# Patient Record
Sex: MALE | Race: BLACK OR AFRICAN AMERICAN | NOT HISPANIC OR LATINO | Employment: STUDENT | URBAN - METROPOLITAN AREA
[De-identification: names, ages, dates, MRNs, and addresses within clinical notes are randomized per-mention and may not be internally consistent; named-entity substitution may affect disease eponyms.]

---

## 2024-06-18 ENCOUNTER — TRANSFERRED RECORDS (OUTPATIENT)
Dept: HEALTH INFORMATION MANAGEMENT | Facility: CLINIC | Age: 29
End: 2024-06-18
Payer: COMMERCIAL

## 2024-06-20 ENCOUNTER — MEDICAL CORRESPONDENCE (OUTPATIENT)
Dept: HEALTH INFORMATION MANAGEMENT | Facility: CLINIC | Age: 29
End: 2024-06-20
Payer: COMMERCIAL

## 2024-07-03 ENCOUNTER — TRANSCRIBE ORDERS (OUTPATIENT)
Dept: OTHER | Age: 29
End: 2024-07-03

## 2024-07-03 DIAGNOSIS — M87.00 AVN (AVASCULAR NECROSIS OF BONE) (H): Primary | ICD-10-CM

## 2024-08-12 DIAGNOSIS — M87.00 AVN (AVASCULAR NECROSIS OF BONE) (H): Primary | ICD-10-CM

## 2024-08-30 NOTE — TELEPHONE ENCOUNTER
REASON FOR VISIT: Bilateral AVN of Hips    DATE OF APPT: 9/03/2024   NOTES (FOR ALL VISITS) STATUS DETAILS   OFFICE NOTE from referring provider [facility]  Dallin Campbell PA-C   MEDICATION LIST N/A    IMAGING  (FOR ALL VISITS)     XR Scheduled [facility]  XR Pelvis and hips 9/03/2024   MRI (HEAD, NECK, SPINE) [facility]  MR Hip right 6/18/2024  MR Hip left 6/18/2024   CT (HEAD, NECK, SPINE) N/A

## 2024-09-03 ENCOUNTER — PRE VISIT (OUTPATIENT)
Dept: ORTHOPEDICS | Facility: CLINIC | Age: 29
End: 2024-09-03

## 2024-09-03 ENCOUNTER — OFFICE VISIT (OUTPATIENT)
Dept: ORTHOPEDICS | Facility: CLINIC | Age: 29
End: 2024-09-03
Payer: COMMERCIAL

## 2024-09-03 ENCOUNTER — ANCILLARY PROCEDURE (OUTPATIENT)
Dept: GENERAL RADIOLOGY | Facility: CLINIC | Age: 29
End: 2024-09-03
Attending: ORTHOPAEDIC SURGERY
Payer: COMMERCIAL

## 2024-09-03 VITALS — BODY MASS INDEX: 17.18 KG/M2 | WEIGHT: 120 LBS | HEIGHT: 70 IN

## 2024-09-03 DIAGNOSIS — M87.00 AVN (AVASCULAR NECROSIS OF BONE) (H): ICD-10-CM

## 2024-09-03 DIAGNOSIS — N39.41 URGE INCONTINENCE OF URINE: Primary | ICD-10-CM

## 2024-09-03 PROCEDURE — 99204 OFFICE O/P NEW MOD 45 MIN: CPT | Mod: GC | Performed by: ORTHOPAEDIC SURGERY

## 2024-09-03 PROCEDURE — 73522 X-RAY EXAM HIPS BI 3-4 VIEWS: CPT | Performed by: RADIOLOGY

## 2024-09-03 ASSESSMENT — HOOS JR
GOING UP OR DOWN STAIRS: EXTREME
SITTING: MODERATE
WALKING ON UNEVEN SURFACE: EXTREME
LYING IN BED (TURNING OVER, MAINTAINING HIP POSITION): SEVERE
HOOS JR TOTAL INTERVAL SCORE: 29.01
RISING FROM SITTING: SEVERE
BENDING TO THE FLOOR TO PICK UP OBJECT: SEVERE

## 2024-09-03 ASSESSMENT — PAIN SCALES - GENERAL: PAINLEVEL: WORST PAIN (10)

## 2024-09-03 NOTE — PROGRESS NOTES
Assessment:   Reji is a 30 yo M who presents with bilateral osteonecrosis of the femoral head. We discussed common causes of osteonecrosis of the femoral head including EtOH abuse, steroid use, and sickle cell disease. We also discussed that this can occur without a clear cause, Richardson's syndrome. We discussed that the treatment for this would be total hip replacement. We will have him follow up with urology due to the patient urge urinary incontinence. He was a prior lumbar spine MRI, which was negative and his exam did not have concerning spine findings. The patient understood the and had all his questions answered.     Plan:  -Referral to urology for urodynamic testing due to urge urinary incontinence in the setting of unremarkable lumbar spine  -RTC for discussion of total hip arthroplasty after being evaluated by urology       Chief Complaint: Consult (KHADAR FARRIS )      Physician:  Dallin Campbell PA-C    HPI: Reji Cohn is a 29 year old male who presents today for evaluation of bilateral hip pain. The patient reports that his symptoms have been present for 2 years and are worsening. He now is having difficulty walking and has pain any movement of bilateral hips. He denies injury. He reports 2-3 EtOH drinks on the weekend. He smokes 1/3PPD. He reports marijuana use but no other elicit drug use.  He denies steroid use. He reports that his hip pain has not progressed to involve his back. He reports occasional numbness and tingling that radiates down the outside of his legs. He reports urge urinary incontinence the occurs multiple times per week. He has had a lumbar MRI in January which was unremarkable.     Location of symptoms:  bilateral hip pain  Onset: insidious onset   Duration of symptoms: 2 yrs  Quality of symptoms: sharp aching pain  Severity: 7/10  Alleviating: activity modification  Exacerbating: activities, movement of hips   Previous Treatments: Previous treatments include activity  "modification, oral pain medication    NORMAN Jr: 29.01   PROMIS Mental: (P) 6  PROMIS Physical: (P) 6  PROMIS Total: (P) 12  UCLA Activity Scale: 3    MEDICAL HISTORY: No past medical history on file.    Pertinent negatives:  Patient has no history of DVT or PE. Discussed risk factors.    Medications:   No current outpatient medications on file.    Allergies: Patient has no known allergies.    SURGICAL HISTORY: No past surgical history on file.    HISTORY: No family history on file.    SOCIAL HISTORY:   Social History     Tobacco Use    Smoking status: Not on file    Smokeless tobacco: Not on file   Substance Use Topics    Alcohol use: Not on file       REVIEW OF SYSTEMS:  The comprehensive review of systems from the intake form was reviewed with the patient.  No fever, weight change or fatigue. No dry eyes. No oral ulcers, sore throat or voice change. No palpitations, syncope, angina or edema.  No chest pain, excessive sleepiness, shortness of breath or hemoptysis.   No abdominal pain, nausea, vomiting, diarrhea or heartburn.  No skin rash. No focal weakness or numbness. No bleeding or lymphadenopathy. No rhinitis or hives.     Exam:  On physical examination the patient appears the stated age, is in no acute distress, alert and oriented, affect is appropriate, and breathing is non-labored.  Vitals are documented in the EMR and have been reviewed:    Ht 1.778 m (5' 10\")   Wt 54.4 kg (120 lb)   BMI 17.22 kg/m    5' 10\"  Body mass index is 17.22 kg/m .    Rises from chair: with pain   Gait: antalgic   Gains the exam table: with pain and some difficulty     RIGHT hip subjective:  Abd: 15   Add: 0  Flexion: 45  IRF: 0   ERF: 15   Tenderness to palpation: over the GT     LEFT hip subjective:  Abd: 15   Add: 0  Flexion: 45  IRF: 0   ERF: 15   Tenderness to palpation: over the GT     No ankle clonus bilaterally   Negative freeman's  2+ reflexes achilles, patellar    Distal the circulatory, motor, and sensation exam is " intact with 5/5 EHL, gastroc-soleus, and tibialis anterior.  Sensation to light touch is intact.  Dorsalis pedis and posterior tibialis pulses are palpable.  There are no sores on the feet, no bruising, and no lymphedema.    Imaging:   XR of pelvis and bilateral hips reveals severe osteonecrosis of the femoral head with collapse.

## 2024-09-03 NOTE — LETTER
9/3/2024      Reji Cohn  [address]  [place] [facility] [place] [facility] [address]      Dear Colleague,    Thank you for referring your patient, Reji Cohn, to the [facility]. Please see a copy of my visit note below.    Assessment:   Reji is a 28 yo M who presents with bilateral osteonecrosis of the femoral head. We discussed common causes of osteonecrosis of the femoral head including EtOH abuse, steroid use, and sickle cell disease. We also discussed that this can occur without a clear cause, Richardson's syndrome. We discussed that the treatment for this would be total hip replacement. We will have him follow up with urology due to the patient urge urinary incontinence. He was a prior lumbar spine MRI, which was negative and his exam did not have concerning spine findings. The patient understood the and had all his questions answered.     Plan:  -Referral to urology for urodynamic testing due to urge urinary incontinence in the setting of unremarkable lumbar spine  -RTC for discussion of total hip arthroplasty after being evaluated by urology       Chief Complaint: Consult (KHADAR FARRIS )      Physician:  Dallin Campbell PA-C    HPI: Reji Cohn is a 29 year old male who presents today for evaluation of bilateral hip pain. The patient reports that his symptoms have been present for 2 years and are worsening. He now is having difficulty walking and has pain any movement of bilateral hips. He denies injury. He reports 2-3 EtOH drinks on the weekend. He smokes 1/3PPD. He reports marijuana use but no other elicit drug use.  He denies steroid use. He reports that his hip pain has not progressed to involve his back. He reports occasional numbness and tingling that radiates down the outside of his legs. He reports urge urinary incontinence the occurs multiple times per week. He has had a lumbar MRI in January which was unremarkable.     Location of symptoms:  bilateral hip  "pain  Onset: insidious onset   Duration of symptoms: 2 yrs  Quality of symptoms: sharp aching pain  Severity: 7/10  Alleviating: activity modification  Exacerbating: activities, movement of hips   Previous Treatments: Previous treatments include activity modification, oral pain medication    NORMAN Jr: 29.01   PROMIS Mental: (P) 6  PROMIS Physical: (P) 6  PROMIS Total: (P) 12  UCLA Activity Scale: 3    MEDICAL HISTORY: No past medical history on file.    Pertinent negatives:  Patient has no history of DVT or PE. Discussed risk factors.    Medications:   No current outpatient medications on file.    Allergies: Patient has no known allergies.    SURGICAL HISTORY: No past surgical history on file.    HISTORY: No family history on file.    SOCIAL HISTORY:   Social History     Tobacco Use     Smoking status: Not on file     Smokeless tobacco: Not on file   Substance Use Topics     Alcohol use: Not on file       REVIEW OF SYSTEMS:  The comprehensive review of systems from the intake form was reviewed with the patient.  No fever, weight change or fatigue. No dry eyes. No oral ulcers, sore throat or voice change. No palpitations, syncope, angina or edema.  No chest pain, excessive sleepiness, shortness of breath or hemoptysis.   No abdominal pain, nausea, vomiting, diarrhea or heartburn.  No skin rash. No focal weakness or numbness. No bleeding or lymphadenopathy. No rhinitis or hives.     Exam:  On physical examination the patient appears the stated age, is in no acute distress, alert and oriented, affect is appropriate, and breathing is non-labored.  Vitals are documented in the EMR and have been reviewed:    Ht 1.778 m (5' 10\")   Wt 54.4 kg (120 lb)   BMI 17.22 kg/m    5' 10\"  Body mass index is 17.22 kg/m .    Rises from chair: with pain   Gait: antalgic   Gains the exam table: with pain and some difficulty     RIGHT hip subjective:  Abd: 15   Add: 0  Flexion: 45  IRF: 0   ERF: 15   Tenderness to palpation: over the GT "     LEFT hip subjective:  Abd: 15   Add: 0  Flexion: 45  IRF: 0   ERF: 15   Tenderness to palpation: over the GT     No ankle clonus bilaterally   Negative freeman's  2+ reflexes achilles, patellar    Distal the circulatory, motor, and sensation exam is intact with 5/5 EHL, gastroc-soleus, and tibialis anterior.  Sensation to light touch is intact.  Dorsalis pedis and posterior tibialis pulses are palpable.  There are no sores on the feet, no bruising, and no lymphedema.    Imaging:   XR of pelvis and bilateral hips reveals severe osteonecrosis of the femoral head with collapse.         I have personally examined this patient and have reviewed the clinical presentation and progress note with the resident. I agree with the treatment plan as outlined. The plan was formulated with the resident on the day of the resident's dictation.      Again, thank you for allowing me to participate in the care of your patient.        Sincerely,        Matthew Brar MD

## 2024-09-03 NOTE — NURSING NOTE
"Reji Cohn's chief complaint for this visit includes:  Chief Complaint   Patient presents with    Consult     KHADAR FARRIS        Referring Provider:  PATRICIA Feliz  [address] [place] [address]    Ht 1.778 m (5' 10\")   Wt 54.4 kg (120 lb)   BMI 17.22 kg/m    Worst Pain (10)   Global Mental Health Score: (P) 6  Global Physical Health Score: (P) 6  PROMIS TOTAL - SUBSCORES: (P) 12  UCLA: 3  NORMAN Cornejo 29.01    Pain increases with: Constant, walking, standing/sitting for extended periods of time.   Previous surgeries: None  Previous injections within last 6 months: NO  Treatments done: OTC, Marijuana  Imaging completed: XR and MRI  Pain: 10/10  Concerns: wants it fixed, pain is moving to his back now.     Jefferson Grewal, ATC            "

## 2024-09-09 ENCOUNTER — TELEPHONE (OUTPATIENT)
Dept: ORTHOPEDICS | Facility: CLINIC | Age: 29
End: 2024-09-09
Payer: COMMERCIAL

## 2024-09-09 NOTE — CONFIDENTIAL NOTE
Other: Patient would like to talk to the provider or the providers team about his pain and also about when his surgery should be. Please call patient back when you can.     Could we send this information to you in iTwin or would you prefer to receive a phone call?:   Patient would prefer a phone call   Okay to leave a detailed message?: Yes at Cell number on file:    Telephone Information:   Mobile [phone]

## 2024-09-09 NOTE — TELEPHONE ENCOUNTER
Called and let Pt know that the only barrier to surgery at this time is to find a cause for his urge incontience as this may cause complications during anaesthesia/surgery. Pt understand this and would like assistance in scheduling consult with Urology.

## 2024-09-09 NOTE — TELEPHONE ENCOUNTER
9/9 First opening is December 2024.     Can we can the status of referral to get a sooner appointment?     Thanks   [facility]   Orthopedics, Podiatry, Sports Medicine, Ent ,Eye , Audiology, Adult Endocrine & Diabetes, Nutrition & Medication Therapy Management Specialties   [facility]

## 2024-09-11 ENCOUNTER — PRE VISIT (OUTPATIENT)
Dept: UROLOGY | Facility: CLINIC | Age: 29
End: 2024-09-11
Payer: COMMERCIAL

## 2024-09-11 NOTE — TELEPHONE ENCOUNTER
Reason for visit: consult     Relevant information: urge incontinence    Records/imaging/labs/orders: in epic    Pt called: No need for a call    At Rooming: triston Talamantes  9/11/2024  3:49 PM

## 2024-09-11 NOTE — TELEPHONE ENCOUNTER
MEDICAL RECORDS REQUEST   [facility]  Urology Clinic  [address] [place] [address]  PHONE: [phone]  Fax: [phone]        FUTURE VISIT INFORMATION                                                   Reji Cohn, : 1995 scheduled for future visit at [facility]    APPOINTMENT INFORMATION:  Date: 2024  Provider:  Luis Moore NP  Reason for Visit/Diagnosis: AVN (avascular necrosis of bone) (H) [M87.00], Urge incontinence of urine [N39.41]    REFERRAL INFORMATION:  Referring provider:  Matthew Brar MD  Referring providers clinic:   ORTHO SURG    RECORDS REQUESTED FOR VISIT                                                     NOTES  STATUS/DETAILS   OFFICE NOTE from referring provider  yes - 24   MEDICATION LIST  no     PRE-VISIT CHECKLIST      Joint diagnostic appointment coordinated correctly          (ensure right order & amount of time) Yes   RECORD COLLECTION COMPLETE Yes

## 2024-09-13 ENCOUNTER — OFFICE VISIT (OUTPATIENT)
Dept: UROLOGY | Facility: CLINIC | Age: 29
End: 2024-09-13
Attending: ORTHOPAEDIC SURGERY
Payer: COMMERCIAL

## 2024-09-13 ENCOUNTER — PRE VISIT (OUTPATIENT)
Dept: UROLOGY | Facility: CLINIC | Age: 29
End: 2024-09-13

## 2024-09-13 VITALS — HEART RATE: 84 BPM | SYSTOLIC BLOOD PRESSURE: 130 MMHG | DIASTOLIC BLOOD PRESSURE: 86 MMHG

## 2024-09-13 DIAGNOSIS — N39.41 URGE INCONTINENCE OF URINE: ICD-10-CM

## 2024-09-13 DIAGNOSIS — M87.00 AVN (AVASCULAR NECROSIS OF BONE) (H): ICD-10-CM

## 2024-09-13 LAB
ALBUMIN UR-MCNC: NEGATIVE MG/DL
APPEARANCE UR: CLEAR
BILIRUB UR QL STRIP: NEGATIVE
COLOR UR AUTO: ABNORMAL
GLUCOSE UR STRIP-MCNC: NEGATIVE MG/DL
HGB UR QL STRIP: NEGATIVE
KETONES UR STRIP-MCNC: NEGATIVE MG/DL
LEUKOCYTE ESTERASE UR QL STRIP: NEGATIVE
MUCOUS THREADS #/AREA URNS LPF: PRESENT /LPF
NITRATE UR QL: NEGATIVE
PH UR STRIP: 6 [PH] (ref 5–7)
RBC URINE: 0 /HPF
SP GR UR STRIP: 1.02 (ref 1–1.03)
UROBILINOGEN UR STRIP-MCNC: NORMAL MG/DL
WBC URINE: 1 /HPF

## 2024-09-13 PROCEDURE — 81001 URINALYSIS AUTO W/SCOPE: CPT | Performed by: PATHOLOGY

## 2024-09-13 PROCEDURE — 99204 OFFICE O/P NEW MOD 45 MIN: CPT

## 2024-09-13 RX ORDER — TOLTERODINE TARTRATE 2 MG/1
2 TABLET, EXTENDED RELEASE ORAL 2 TIMES DAILY
Qty: 30 TABLET | Refills: 1 | Status: SHIPPED | OUTPATIENT
Start: 2024-09-13 | End: 2024-09-17

## 2024-09-13 RX ORDER — KETOROLAC TROMETHAMINE 10 MG/1
TABLET, FILM COATED ORAL
COMMUNITY
Start: 2024-06-20

## 2024-09-13 RX ORDER — IBUPROFEN 600 MG/1
600 TABLET, FILM COATED ORAL
COMMUNITY
Start: 2023-07-27

## 2024-09-13 RX ORDER — ACETAMINOPHEN 325 MG/1
325-650 TABLET ORAL
COMMUNITY
Start: 2023-07-27

## 2024-09-13 NOTE — PROGRESS NOTES
HPI:  Reji Cohn is a 29 year old male w/ avascular osteonecrosis of bilateral femoral head being seen for UUI.    Patient is followed by Dr. Brar from Ortho for bilateral osteonecrosis of femoral head.  During his last visit with Dr. Brar on 9/3/2024, they discussed total hip replacement.  Dr. Brar would like him to follow-up with urology for UUI and get UDS prior to scheduling for total hip replacement.  He had a negative lumbar spine MRI, and his spinal exam was negative.    Today-  -patient has had hip pain for 2 years  -Lower back pain in urgency incontinence started 6 months  - he uses tissues for UI, changed every hour  - bladder pain when urinating,   - frequency/urgency - void every hour while awake, nocturia x3  - no gross hematuria, no straining, strong flow, no constipation      Reviewed previous notes from Dr. Collado from Ortho service at [facility]    Exam:  /86 (BP Location: Right arm, Patient Position: Sitting, Cuff Size: Adult Regular)   Pulse 84   General: age-appropriate appearing male in NAD sitting in an exam chair  HEENT: Head AT/NC, EOMI, CN Grossly intact.  Resp: no respiratory distress  Abdomen: Degree of obesity is none. Abdomen is soft and nontender. No organomegaly  : testicles normal without atrophy or masses, no hernias, and penis normal without urethral discharge  Motor: excellent strength throughout    Review of Imaging:  The following imaging exams were independently viewed and interpreted by me and discussed with patient:  none    Review of Labs:  The following labs were reviewed by me and discussed with the patient:  No results found for this or any previous visit (from the past 720 hour(s)).        Assessment & Plan   UUI  AVN    PVR 0  Urinalysis negative    - patient does not need to wait for UDS results before ortho surgery.  - Trial of tolterodine  - Follow up with me in 1-2 months, VV okay  - Schedule UDS at the next available.      Luis  JOSH Moore  [facility] [place]    ==========================      Additional Coding Information:    Problems:  4 -- one undiagnosed new problem with uncertain prognosis    Data Reviewed  Review of external notes as documented above     Tests ordered: PVR, UAUC, uds      Discussion of management or test interpretation with external physician/other qualified healthcare professional/appropriate source - Dr Brar    Level of risk:  4 -- prescription drug management

## 2024-09-13 NOTE — LETTER
9/13/2024       RE: Reji Cohn  [address]  [place] [place] [facility] [address]     Dear Colleague,    Thank you for referring your patient, Reji Cohn, to the [facility] [place] [facility]. Please see a copy of my visit note below.    Chief Complaint   Patient presents with     Consult       Blood pressure 130/86, pulse 84. There is no height or weight on file to calculate BMI.    There is no problem list on file for this patient.      No Known Allergies    Current Outpatient Medications   Medication Sig Dispense Refill     acetaminophen (TYLENOL) 325 MG tablet Take 325-650 mg by mouth.       ibuprofen (ADVIL/MOTRIN) 600 MG tablet Take 600 mg by mouth.       ketorolac (TORADOL) 10 MG tablet               Vipul Erenbo  9/13/2024  1:33 PM       HPI:  Reji Cohn is a 29 year old male w/ avascular osteonecrosis of bilateral femoral head being seen for UUI.    Patient is followed by Dr. Brar from Ortho for bilateral osteonecrosis of femoral head.  During his last visit with Dr. Brar on 9/3/2024, they discussed total hip replacement.  Dr. Brar would like him to follow-up with urology for UUI and get UDS prior to scheduling for total hip replacement.  He had a negative lumbar spine MRI, and his spinal exam was negative.    Today-  -patient has had hip pain for 2 years  -Lower back pain in urgency incontinence started 6 months  - he uses tissues for UI, changed every hour  - bladder pain when urinating,   - frequency/urgency - void every hour while awake, nocturia x3  - no gross hematuria, no straining, strong flow, no constipation      Reviewed previous notes from Dr. Collado from Ortho service at [facility]    Exam:  /86 (BP Location: Right arm, Patient Position: Sitting, Cuff Size: Adult Regular)   Pulse 84   General: age-appropriate appearing male in NAD sitting in an exam chair  HEENT: Head AT/NC,  EOMI, CN Grossly intact.  Resp: no respiratory distress  Abdomen: Degree of obesity is none. Abdomen is soft and nontender. No organomegaly  : testicles normal without atrophy or masses, no hernias, and penis normal without urethral discharge  Motor: excellent strength throughout    Review of Imaging:  The following imaging exams were independently viewed and interpreted by me and discussed with patient:  none    Review of Labs:  The following labs were reviewed by me and discussed with the patient:  No results found for this or any previous visit (from the past 720 hour(s)).        Assessment & Plan  UUI  AVN    PVR 0  Urinalysis negative    - patient does not need to wait for UDS results before ortho surgery.  - Trial of tolterodine  - Follow up with me in 1-2 months, VV okay  - Schedule UDS at the next available.      Luis Moore NP  [facility] [place]    ==========================      Additional Coding Information:    Problems:  4 -- one undiagnosed new problem with uncertain prognosis    Data Reviewed  Review of external notes as documented above     Tests ordered: PVR, UAUC, uds      Discussion of management or test interpretation with external physician/other qualified healthcare professional/appropriate source - Dr Brar    Level of risk:  4 -- prescription drug management                Again, thank you for allowing me to participate in the care of your patient.      Sincerely,    Luis Moore NP

## 2024-09-13 NOTE — PROGRESS NOTES
Chief Complaint   Patient presents with    Consult       Blood pressure 130/86, pulse 84. There is no height or weight on file to calculate BMI.    There is no problem list on file for this patient.      No Known Allergies    Current Outpatient Medications   Medication Sig Dispense Refill    acetaminophen (TYLENOL) 325 MG tablet Take 325-650 mg by mouth.      ibuprofen (ADVIL/MOTRIN) 600 MG tablet Take 600 mg by mouth.      ketorolac (TORADOL) 10 MG tablet               Vipul Mezgegenet  9/13/2024  1:33 PM

## 2024-09-16 NOTE — TELEPHONE ENCOUNTER
Pt was able to be seen sooner for preoperative eval visit. Waiting on results/feedback from Urology provider.

## 2024-09-17 RX ORDER — TOLTERODINE TARTRATE 2 MG/1
2 TABLET, EXTENDED RELEASE ORAL 2 TIMES DAILY
Qty: 30 TABLET | Refills: 1 | Status: SHIPPED | OUTPATIENT
Start: 2024-09-17

## 2024-09-18 ENCOUNTER — TELEPHONE (OUTPATIENT)
Dept: UROLOGY | Facility: CLINIC | Age: 29
End: 2024-09-18
Payer: COMMERCIAL

## 2024-09-18 NOTE — TELEPHONE ENCOUNTER
Patient Contacted for the patient to call back and schedule the following:    Unable to LVM    Appointment type: Return   Provider: Vi Moore   Return date: October or Nov (in person or VV)  Specialty phone number: [phone]  Additonal Notes: per check out -Follow-up with me in 1 to 2 months, VV is okay       Appointment type: Urodynamic   Provider: Trista Can   Return date: Next available   Specialty phone number: [phone]  Additonal Notes: per check out -   Schedule UDS at the next available

## 2025-06-25 DIAGNOSIS — M87.00 AVN (AVASCULAR NECROSIS OF BONE) (H): Primary | ICD-10-CM
